# Patient Record
Sex: MALE | Race: OTHER | Employment: UNEMPLOYED | URBAN - METROPOLITAN AREA
[De-identification: names, ages, dates, MRNs, and addresses within clinical notes are randomized per-mention and may not be internally consistent; named-entity substitution may affect disease eponyms.]

---

## 2017-05-05 ENCOUNTER — TELEPHONE (OUTPATIENT)
Dept: NEUROLOGY | Facility: CLINIC | Age: 37
End: 2017-05-05

## 2017-05-19 ENCOUNTER — TELEPHONE (OUTPATIENT)
Dept: NEUROLOGY | Facility: CLINIC | Age: 37
End: 2017-05-19

## 2017-12-01 ENCOUNTER — TELEPHONE (OUTPATIENT)
Dept: NEUROLOGY | Facility: CLINIC | Age: 37
End: 2017-12-01

## 2018-02-08 ENCOUNTER — OFFICE VISIT (OUTPATIENT)
Dept: NEUROLOGY | Facility: CLINIC | Age: 38
End: 2018-02-08

## 2018-02-08 ENCOUNTER — MED REC SCAN ONLY (OUTPATIENT)
Dept: NEUROLOGY | Facility: CLINIC | Age: 38
End: 2018-02-08

## 2018-02-08 VITALS
HEIGHT: 71 IN | WEIGHT: 185 LBS | HEART RATE: 88 BPM | DIASTOLIC BLOOD PRESSURE: 88 MMHG | SYSTOLIC BLOOD PRESSURE: 144 MMHG | RESPIRATION RATE: 16 BRPM | BODY MASS INDEX: 25.9 KG/M2

## 2018-02-08 DIAGNOSIS — M79.605 LOW BACK PAIN RADIATING TO LEFT LEG: ICD-10-CM

## 2018-02-08 DIAGNOSIS — S94.8X2S: Primary | ICD-10-CM

## 2018-02-08 DIAGNOSIS — R20.2 PARESTHESIA: ICD-10-CM

## 2018-02-08 DIAGNOSIS — M54.50 LOW BACK PAIN RADIATING TO LEFT LEG: ICD-10-CM

## 2018-02-08 PROCEDURE — 99214 OFFICE O/P EST MOD 30 MIN: CPT | Performed by: OTHER

## 2018-02-08 RX ORDER — GABAPENTIN 100 MG/1
CAPSULE ORAL
Qty: 270 CAPSULE | Refills: 1 | Status: SHIPPED | OUTPATIENT
Start: 2018-02-08 | End: 2018-03-14

## 2018-02-08 RX ORDER — ERGOCALCIFEROL 1.25 MG/1
50000 CAPSULE ORAL
COMMUNITY
End: 2018-04-09 | Stop reason: ALTCHOICE
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
## 2018-02-09 ENCOUNTER — TELEPHONE (OUTPATIENT)
Dept: NEUROLOGY | Facility: CLINIC | Age: 38
End: 2018-02-09

## 2018-02-12 ENCOUNTER — TELEPHONE (OUTPATIENT)
Dept: NEUROLOGY | Facility: CLINIC | Age: 38
End: 2018-02-12

## 2018-02-12 NOTE — TELEPHONE ENCOUNTER
Pt came in the office requesting additional information on letter. Pt requires letter to state he is unable to work indefinitely due to back and foot/leg pain. Please advise.

## 2018-02-12 NOTE — TELEPHONE ENCOUNTER
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

## 2018-02-12 NOTE — TELEPHONE ENCOUNTER
Pt calling to state that he needs a note from Dr. Saintclair Saltness stating his condition and work restrictions.  Pt states that he shows Dr. Christopher Lemus the paperwork sent to him from [facility] and it was for the pt to fill out and not him but they are requesting further explanat

## 2018-02-12 NOTE — TELEPHONE ENCOUNTER
Rec'd fax from [facility] with Approval for MRI L-spine with Authorization #[id number] valid until 03/25/2018  Will call patient to inform of the Approval, transferred patient to schedule Ex [phone]

## 2018-02-14 ENCOUNTER — TELEPHONE (OUTPATIENT)
Dept: NEUROLOGY | Facility: CLINIC | Age: 38
End: 2018-02-14

## 2018-02-21 ENCOUNTER — HOSPITAL ENCOUNTER (OUTPATIENT)
Dept: MRI IMAGING | Facility: HOSPITAL | Age: 38
Discharge: HOME | End: 2018-02-21
Attending: Other
Payer: MEDICAID

## 2018-02-21 ENCOUNTER — TELEPHONE (OUTPATIENT)
Dept: NEUROLOGY | Facility: CLINIC | Age: 38
End: 2018-02-21

## 2018-02-21 DIAGNOSIS — M79.605 LOW BACK PAIN RADIATING TO LEFT LEG: ICD-10-CM

## 2018-02-21 DIAGNOSIS — R20.2 PARESTHESIA: ICD-10-CM

## 2018-02-21 DIAGNOSIS — M54.50 LOW BACK PAIN RADIATING TO LEFT LEG: ICD-10-CM

## 2018-02-21 PROCEDURE — 72148 MRI LUMBAR SPINE W/O DYE: CPT | Performed by: OTHER

## 2018-02-21 NOTE — TELEPHONE ENCOUNTER
----- Message from Vidhi Kaur MD sent at 2/21/2018  4:27 PM CST -----  Please let patient know that MRI L spine showed some mild degenerative changes, that probably best addressed by PT.

## 2018-03-06 ENCOUNTER — OFFICE VISIT (OUTPATIENT)
Dept: PHYSICAL THERAPY | Age: 38
End: 2018-03-06
Attending: Other
Payer: MEDICAID

## 2018-03-06 DIAGNOSIS — M79.605 LOW BACK PAIN RADIATING TO LEFT LEG: ICD-10-CM

## 2018-03-06 DIAGNOSIS — R20.2 PARESTHESIA: ICD-10-CM

## 2018-03-06 DIAGNOSIS — M54.50 LOW BACK PAIN RADIATING TO LEFT LEG: ICD-10-CM

## 2018-03-06 DIAGNOSIS — S94.8X2S: ICD-10-CM

## 2018-03-06 PROCEDURE — 97163 PT EVAL HIGH COMPLEX 45 MIN: CPT

## 2018-03-06 NOTE — PROGRESS NOTES
P.T. EVALUATION:   Referring Physician: Dr. 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     Precautions:  None     OBJECTIVE:   Observation: pt ambulates into clinic independently    Sensation: altered sensation in L plantar surface of foot - burning please contact me at Dept: [phone]    Sincerely,  Electronically signed by therapist: Hardeep Rg, PT    [de-identified] certification required: Yes  I certify the need for these services furnished under this plan of treatment and while under my care.

## 2018-03-07 ENCOUNTER — APPOINTMENT (OUTPATIENT)
Dept: PHYSICAL THERAPY | Age: 38
End: 2018-03-07
Attending: Other
Payer: MEDICAID

## 2018-03-12 ENCOUNTER — OFFICE VISIT (OUTPATIENT)
Dept: PHYSICAL THERAPY | Age: 38
End: 2018-03-12
Attending: Other
Payer: MEDICAID

## 2018-03-12 PROCEDURE — 97110 THERAPEUTIC EXERCISES: CPT

## 2018-03-12 NOTE — PROGRESS NOTES
Diagnosis: Injury of plantar nerve, left, sequela (I97.0L5I)  Low back pain radiating to left leg (M54.5)  Paresthesia (R20.2)       Authorized # of Visits:  2/8 [facility])         Next MD visit: none scheduled  Fall Risk: standard         Precautions: n/a

## 2018-03-14 ENCOUNTER — TELEPHONE (OUTPATIENT)
Dept: PHYSICAL THERAPY | Age: 38
End: 2018-03-14

## 2018-03-14 ENCOUNTER — OFFICE VISIT (OUTPATIENT)
Dept: PHYSICAL THERAPY | Age: 38
End: 2018-03-14
Attending: Other
Payer: MEDICAID

## 2018-03-14 ENCOUNTER — OFFICE VISIT (OUTPATIENT)
Dept: NEUROLOGY | Facility: CLINIC | Age: 38
End: 2018-03-14

## 2018-03-14 VITALS
SYSTOLIC BLOOD PRESSURE: 124 MMHG | HEIGHT: 71 IN | HEART RATE: 82 BPM | RESPIRATION RATE: 16 BRPM | WEIGHT: 198 LBS | DIASTOLIC BLOOD PRESSURE: 82 MMHG | BODY MASS INDEX: 27.72 KG/M2

## 2018-03-14 DIAGNOSIS — M54.50 LOW BACK PAIN RADIATING TO LEFT LEG: ICD-10-CM

## 2018-03-14 DIAGNOSIS — R20.2 PARESTHESIA: ICD-10-CM

## 2018-03-14 DIAGNOSIS — S94.8X2S: ICD-10-CM

## 2018-03-14 DIAGNOSIS — M79.605 LOW BACK PAIN RADIATING TO LEFT LEG: ICD-10-CM

## 2018-03-14 DIAGNOSIS — S94.8X2S: Primary | ICD-10-CM

## 2018-03-14 PROCEDURE — 97110 THERAPEUTIC EXERCISES: CPT

## 2018-03-14 PROCEDURE — 99214 OFFICE O/P EST MOD 30 MIN: CPT | Performed by: OTHER

## 2018-03-14 NOTE — PATIENT INSTRUCTIONS
Gabapentin lower to 2 pills 3 times a day for 3 days then 1 pill 3 times a day for another 3 days then stop
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
## 2018-03-14 NOTE — PROGRESS NOTES
Diagnosis: Injury of plantar nerve, left, sequela (U53.5O1X)  Low back pain radiating to left leg (M54.5)  Paresthesia (R20.2)       Authorized # of Visits:  3/8 [facility])         Next MD visit: none scheduled  Fall Risk: standard         Precautions: n/a

## 2018-03-16 ENCOUNTER — OFFICE VISIT (OUTPATIENT)
Dept: PHYSICAL THERAPY | Age: 38
End: 2018-03-16
Attending: Other
Payer: MEDICAID

## 2018-03-16 PROCEDURE — 97110 THERAPEUTIC EXERCISES: CPT

## 2018-03-16 PROCEDURE — 97140 MANUAL THERAPY 1/> REGIONS: CPT

## 2018-03-16 NOTE — PROGRESS NOTES
Diagnosis: Injury of plantar nerve, left, sequela (D36.9Q0S)  Low back pain radiating to left leg (M54.5)  Paresthesia (R20.2)       Authorized # of Visits:  4/8 [facility])         Next MD visit: none scheduled  Fall Risk: standard         Precautions: n/a

## 2018-03-19 ENCOUNTER — OFFICE VISIT (OUTPATIENT)
Dept: PHYSICAL THERAPY | Age: 38
End: 2018-03-19
Attending: Other
Payer: MEDICAID

## 2018-03-19 PROCEDURE — 97110 THERAPEUTIC EXERCISES: CPT

## 2018-03-19 NOTE — PROGRESS NOTES
Diagnosis: Injury of plantar nerve, left, sequela (K92.3Y1F)  Low back pain radiating to left leg (M54.5)  Paresthesia (R20.2)       Authorized # of Visits:  5/8 [facility])         Next MD visit: none scheduled  Fall Risk: standard         Precautions: n/a

## 2018-03-21 ENCOUNTER — OFFICE VISIT (OUTPATIENT)
Dept: PHYSICAL THERAPY | Age: 38
End: 2018-03-21
Attending: Other
Payer: MEDICAID

## 2018-03-21 PROCEDURE — 97110 THERAPEUTIC EXERCISES: CPT

## 2018-03-21 NOTE — PROGRESS NOTES
Diagnosis: Injury of plantar nerve, left, sequela (U17.4I5C)  Low back pain radiating to left leg (M54.5)  Paresthesia (R20.2)       Authorized # of Visits:  6/9 [facility])         Next MD visit: none scheduled  Fall Risk: standard         Precautions: n/a

## 2018-03-23 ENCOUNTER — OFFICE VISIT (OUTPATIENT)
Dept: PHYSICAL THERAPY | Age: 38
End: 2018-03-23
Attending: Other
Payer: MEDICAID

## 2018-03-23 PROCEDURE — 97110 THERAPEUTIC EXERCISES: CPT

## 2018-03-23 NOTE — PROGRESS NOTES
Diagnosis: Injury of plantar nerve, left, sequela (R88.2R9U)  Low back pain radiating to left leg (M54.5)  Paresthesia (R20.2)       Authorized # of Visits:  7/9 [facility])         Next MD visit: none scheduled  Fall Risk: standard         Precautions: n/a

## 2018-03-26 ENCOUNTER — OFFICE VISIT (OUTPATIENT)
Dept: PHYSICAL THERAPY | Age: 38
End: 2018-03-26
Attending: Other
Payer: MEDICAID

## 2018-03-26 PROCEDURE — 97140 MANUAL THERAPY 1/> REGIONS: CPT

## 2018-03-26 PROCEDURE — 97110 THERAPEUTIC EXERCISES: CPT

## 2018-03-26 NOTE — PROGRESS NOTES
Physical Therapy Progress Report    Pt has attended 8 visits in Physical Therapy.      Diagnosis: Injury of plantar nerve, left, sequela (W59.5X4M)  Low back pain radiating to left leg (M54.5)  Paresthesia (R20.2)       Authorized # of Visits:  8/9 ([facility]) isometric MET technique 1x10, 1x5 5 sec holds    -shuttle machine B knee ext B knee ext 5 bands 2x10    -supine L ankle circles CW/CCW 20x ea    -sitting with lumbar roll x 2 minutes    Manual Techniques:   - Trigger point release, L lumbar paraspinals, L

## 2018-03-28 ENCOUNTER — APPOINTMENT (OUTPATIENT)
Dept: PHYSICAL THERAPY | Age: 38
End: 2018-03-28
Attending: Other
Payer: MEDICAID

## 2018-03-30 ENCOUNTER — OFFICE VISIT (OUTPATIENT)
Dept: PHYSICAL THERAPY | Age: 38
End: 2018-03-30
Attending: Other
Payer: MEDICAID

## 2018-03-30 PROCEDURE — 97140 MANUAL THERAPY 1/> REGIONS: CPT

## 2018-03-30 PROCEDURE — 97110 THERAPEUTIC EXERCISES: CPT

## 2018-03-30 NOTE — PROGRESS NOTES
Diagnosis: Injury of plantar nerve, left, sequela (W27.6D1Y)  Low back pain radiating to left leg (M54.5)  Paresthesia (R20.2)       Authorized # of Visits:  9/14 ([facility]) (EXP. 5/2/18)         Next MD visit: 4/9/16 with Dr. Santiago Clinton  Fall Risk: standard

## 2018-04-02 ENCOUNTER — OFFICE VISIT (OUTPATIENT)
Dept: PHYSICAL THERAPY | Age: 38
End: 2018-04-02
Attending: Other
Payer: MEDICAID

## 2018-04-02 PROCEDURE — 97110 THERAPEUTIC EXERCISES: CPT

## 2018-04-02 PROCEDURE — 97140 MANUAL THERAPY 1/> REGIONS: CPT

## 2018-04-02 NOTE — PROGRESS NOTES
Diagnosis: Injury of plantar nerve, left, sequela (I37.8A1H)  Low back pain radiating to left leg (M54.5)  Paresthesia (R20.2)       Authorized # of Visits:  10/14 ([facility]) (EXP. 5/2/18)         Next MD visit: 4/9/16 with Dr. Kendell Richey  Fall Risk: standard improve his overall gait pattern - IN PROGRESS  4- Pt will report 50% reduction in back and leg symptoms to ease ability for pt to performing standing tasks - IN PROGRESS

## 2018-04-03 ENCOUNTER — TELEPHONE (OUTPATIENT)
Dept: NEUROLOGY | Facility: CLINIC | Age: 38
End: 2018-04-03

## 2018-04-04 ENCOUNTER — APPOINTMENT (OUTPATIENT)
Dept: PHYSICAL THERAPY | Age: 38
End: 2018-04-04
Attending: Other
Payer: MEDICAID

## 2018-04-06 ENCOUNTER — OFFICE VISIT (OUTPATIENT)
Dept: PHYSICAL THERAPY | Age: 38
End: 2018-04-06
Attending: Other
Payer: MEDICAID

## 2018-04-06 PROCEDURE — 97110 THERAPEUTIC EXERCISES: CPT

## 2018-04-06 NOTE — PROGRESS NOTES
Diagnosis: Injury of plantar nerve, left, sequela (P43.0N3G)  Low back pain radiating to left leg (M54.5)  Paresthesia (R20.2)       Authorized # of Visits:  11/14 ([facility]) (EXP. 5/2/18)         Next MD visit: 4/9/16 with Dr. America Morel  Fall Risk: standard his overall gait pattern - IN PROGRESS  4- Pt will report 50% reduction in back and leg symptoms to ease ability for pt to performing standing tasks - IN PROGRESS

## 2018-04-09 ENCOUNTER — OFFICE VISIT (OUTPATIENT)
Dept: NEUROLOGY | Facility: CLINIC | Age: 38
End: 2018-04-09

## 2018-04-09 ENCOUNTER — TELEPHONE (OUTPATIENT)
Dept: PHYSICAL THERAPY | Age: 38
End: 2018-04-09

## 2018-04-09 ENCOUNTER — TELEPHONE (OUTPATIENT)
Dept: NEUROLOGY | Facility: CLINIC | Age: 38
End: 2018-04-09

## 2018-04-09 ENCOUNTER — OFFICE VISIT (OUTPATIENT)
Dept: PHYSICAL THERAPY | Age: 38
End: 2018-04-09
Attending: Other
Payer: MEDICAID

## 2018-04-09 VITALS
DIASTOLIC BLOOD PRESSURE: 82 MMHG | HEART RATE: 72 BPM | BODY MASS INDEX: 26.32 KG/M2 | WEIGHT: 188 LBS | RESPIRATION RATE: 16 BRPM | SYSTOLIC BLOOD PRESSURE: 122 MMHG | HEIGHT: 71 IN

## 2018-04-09 DIAGNOSIS — M54.17 LUMBOSACRAL RADICULOPATHY AT S1: Primary | ICD-10-CM

## 2018-04-09 DIAGNOSIS — M48.061 SPINAL STENOSIS OF LUMBAR REGION WITHOUT NEUROGENIC CLAUDICATION: ICD-10-CM

## 2018-04-09 PROCEDURE — 99204 OFFICE O/P NEW MOD 45 MIN: CPT | Performed by: PHYSICAL MEDICINE & REHABILITATION

## 2018-04-09 PROCEDURE — 97140 MANUAL THERAPY 1/> REGIONS: CPT

## 2018-04-09 PROCEDURE — 97110 THERAPEUTIC EXERCISES: CPT

## 2018-04-09 NOTE — H&P
[facility] [address], [place] [address], [address], [facility]    History and Physical    Guadalupe Favors Patient Status:  No patient class for patient encounter    3/29/1980 MRN [id number]   Location [facility] [address], [place] [address], foot   Onset of pain:  2015 as above   Character of pain: \"tension and heat\" in the low back   Where does pain radiate:  Posterior leg to the foot   Numbness/Tingling:  + numbness in the plantar aspect of the left foot   Weakness: denies   Worsening fact well-developed and well-nourished. HENT:   Head: Normocephalic and atraumatic. Eyes: Conjunctivae and EOM are normal.   Cardiovascular: Normal rate, regular rhythm and normal heart sounds.     Pulmonary/Chest: Effort normal and breath sounds normal.   A visible mass. OTHER:             Negative. LUMBAR DISC LEVELS:  L1-L2:   No significant disc/facet abnormality, spinal stenosis, or foraminal stenosis. L2-L3:   No significant disc/facet abnormality, spinal stenosis, or foraminal stenosis.     L3-

## 2018-04-09 NOTE — PROGRESS NOTES
Diagnosis: Injury of plantar nerve, left, sequela (J44.5A1H)  Low back pain radiating to left leg (M54.5)  Paresthesia (R20.2)       Authorized # of Visits:  12/14 ([facility]) (EXP. 5/2/18)         Next MD visit: 4/9/16 with Dr. Joslyn Osullivan  Fall Risk: standard performing standing tasks - IN PROGRESS

## 2018-04-09 NOTE — TELEPHONE ENCOUNTER
Shanon Lombardi for Authorization of Approval for Left L4-5 transforaminal epidural steroid injection under fluoroscopy, local anesthesia with CPT codes 82161 / 74428, Pending Case #[id number]

## 2018-04-10 NOTE — TELEPHONE ENCOUNTER
Faxed clinical notes to [phone] [phone] for Authorization for Lt L4-5 TFESI after checking to see if it had been Approved, still pending

## 2018-04-11 ENCOUNTER — APPOINTMENT (OUTPATIENT)
Dept: PHYSICAL THERAPY | Age: 38
End: 2018-04-11
Attending: Other
Payer: MEDICAID

## 2018-04-11 ENCOUNTER — TELEPHONE (OUTPATIENT)
Dept: PHYSICAL THERAPY | Age: 38
End: 2018-04-11

## 2018-04-11 NOTE — TELEPHONE ENCOUNTER
Spoke to patient and scheduled for epidural injection EM for 5/4/18. Verbal instructions given to patient, denies NASAIDs or fish oil. Patient received written injection instructions at [facility]. [facility] [place] scheduling notified.

## 2018-04-13 ENCOUNTER — APPOINTMENT (OUTPATIENT)
Dept: PHYSICAL THERAPY | Age: 38
End: 2018-04-13
Attending: Other
Payer: MEDICAID

## 2018-05-03 NOTE — TELEPHONE ENCOUNTER
Spoke to patient. He states that he would like to cancel injection for tomorrow. He admits that was feeling anxious about it today and would rather cancel. He states that he will call back when he is prepared to reschedule.
